# Patient Record
Sex: FEMALE | Race: BLACK OR AFRICAN AMERICAN
[De-identification: names, ages, dates, MRNs, and addresses within clinical notes are randomized per-mention and may not be internally consistent; named-entity substitution may affect disease eponyms.]

---

## 2019-02-13 ENCOUNTER — APPOINTMENT (OUTPATIENT)
Dept: SPINE | Facility: CLINIC | Age: 29
End: 2019-02-13
Payer: COMMERCIAL

## 2019-02-13 VITALS
RESPIRATION RATE: 14 BRPM | SYSTOLIC BLOOD PRESSURE: 116 MMHG | WEIGHT: 120 LBS | DIASTOLIC BLOOD PRESSURE: 79 MMHG | HEIGHT: 64 IN | BODY MASS INDEX: 20.49 KG/M2 | OXYGEN SATURATION: 99 % | HEART RATE: 64 BPM

## 2019-02-13 DIAGNOSIS — Z83.3 FAMILY HISTORY OF DIABETES MELLITUS: ICD-10-CM

## 2019-02-13 DIAGNOSIS — Z86.018 PERSONAL HISTORY OF OTHER BENIGN NEOPLASM: ICD-10-CM

## 2019-02-13 DIAGNOSIS — Z78.9 OTHER SPECIFIED HEALTH STATUS: ICD-10-CM

## 2019-02-13 DIAGNOSIS — D35.2 BENIGN NEOPLASM OF PITUITARY GLAND: ICD-10-CM

## 2019-02-13 PROCEDURE — 99244 OFF/OP CNSLTJ NEW/EST MOD 40: CPT

## 2019-02-13 RX ORDER — SUMATRIPTAN 100 MG/1
100 TABLET, FILM COATED ORAL
Refills: 0 | Status: ACTIVE | COMMUNITY

## 2019-02-28 ENCOUNTER — APPOINTMENT (OUTPATIENT)
Dept: ENDOCRINOLOGY | Facility: CLINIC | Age: 29
End: 2019-02-28
Payer: COMMERCIAL

## 2019-02-28 VITALS
SYSTOLIC BLOOD PRESSURE: 120 MMHG | BODY MASS INDEX: 20.49 KG/M2 | HEIGHT: 64 IN | HEART RATE: 80 BPM | OXYGEN SATURATION: 97 % | DIASTOLIC BLOOD PRESSURE: 78 MMHG | WEIGHT: 120 LBS

## 2019-02-28 PROCEDURE — 99244 OFF/OP CNSLTJ NEW/EST MOD 40: CPT

## 2019-02-28 RX ORDER — MEDROXYPROGESTERONE ACETATE 150 MG/ML
150 INJECTION, SUSPENSION INTRAMUSCULAR
Refills: 0 | Status: DISCONTINUED | COMMUNITY
End: 2019-02-28

## 2019-03-12 ENCOUNTER — RX RENEWAL (OUTPATIENT)
Age: 29
End: 2019-03-12

## 2019-03-12 LAB
MONOMERIC PROLACTIN (ICMA)*: 35 NG/ML
PERCENT MACROPROLACTIN: 0 %
PROLACTIN SERPL-MCNC: 45.1 NG/ML
PROLACTIN, SERUM (ICMA)*: 35 NG/ML

## 2019-03-15 ENCOUNTER — TRANSCRIPTION ENCOUNTER (OUTPATIENT)
Age: 29
End: 2019-03-15

## 2019-05-02 ENCOUNTER — APPOINTMENT (OUTPATIENT)
Dept: ENDOCRINOLOGY | Facility: CLINIC | Age: 29
End: 2019-05-02

## 2019-06-13 ENCOUNTER — TRANSCRIPTION ENCOUNTER (OUTPATIENT)
Age: 29
End: 2019-06-13

## 2019-06-19 ENCOUNTER — APPOINTMENT (OUTPATIENT)
Dept: SPINE | Facility: CLINIC | Age: 29
End: 2019-06-19

## 2019-06-27 ENCOUNTER — APPOINTMENT (OUTPATIENT)
Dept: ENDOCRINOLOGY | Facility: CLINIC | Age: 29
End: 2019-06-27

## 2019-11-15 ENCOUNTER — MEDICATION RENEWAL (OUTPATIENT)
Age: 29
End: 2019-11-15

## 2019-11-15 ENCOUNTER — TRANSCRIPTION ENCOUNTER (OUTPATIENT)
Age: 29
End: 2019-11-15

## 2020-11-18 ENCOUNTER — TRANSCRIPTION ENCOUNTER (OUTPATIENT)
Age: 30
End: 2020-11-18

## 2020-12-14 ENCOUNTER — TRANSCRIPTION ENCOUNTER (OUTPATIENT)
Age: 30
End: 2020-12-14

## 2020-12-16 ENCOUNTER — APPOINTMENT (OUTPATIENT)
Dept: SPINE | Facility: CLINIC | Age: 30
End: 2020-12-16

## 2021-05-14 ENCOUNTER — APPOINTMENT (OUTPATIENT)
Dept: SPINE | Facility: CLINIC | Age: 31
End: 2021-05-14
Payer: COMMERCIAL

## 2021-05-14 DIAGNOSIS — D35.2 BENIGN NEOPLASM OF PITUITARY GLAND: ICD-10-CM

## 2021-05-14 PROCEDURE — 99212 OFFICE O/P EST SF 10 MIN: CPT | Mod: 95

## 2021-05-14 NOTE — ASSESSMENT
[FreeTextEntry1] : The patient is to follow up with Dr. Bustillos and was advised to follow up with Dr. Fortune if she is still having headaches.  She is to have a new MRI of the brain and sella with and without contrast and return to the office for review of the images.  The patient was asked to also obtain her previous images.

## 2021-05-14 NOTE — CONSULT LETTER
[Dear  ___] : Dear  [unfilled], [Consult Letter:] : I had the pleasure of evaluating your patient, [unfilled]. [Please see my note below.] : Please see my note below. [Consult Closing:] : Thank you very much for allowing me to participate in the care of this patient.  If you have any questions, please do not hesitate to contact me. [Sincerely,] : Sincerely, [FreeTextEntry2] : Gabriele Fortune M.D.\par [address] [place] [address] [FreeTextEntry1] : Cathy Ortega\par 02//13/1990 [FreeTextEntry3] : PRESTON Kerr.\par Nurse Practitioner\par Neurosurgery and Spine Department\par [facility]\par

## 2021-05-14 NOTE — HISTORY OF PRESENT ILLNESS
[FreeTextEntry4] : PRESTON Kerr. [FreeTextEntry1] : MONICO CORDERO is a 31 year old lady who was last seen on 02/13/2019 regarding a prolactinoma.  In May of 2017 she complained of bilateral breast discomfort and had a work up which revealed an elevated prolactin level of 78.  She had an MRI at that time which showed an adenoma of the pituitary on the left side of the gland.  She saw an endocrinologist at that time and was started on Dostinex 0.5 mg 1 x week.  She than felt better and stopped taking it in October of 2017.  She was doing well until September of 2018, when she began having a feeling of headaches and forgetfulness.  She was followed by Dr. Fortune for a neurologic evaluation and had an MRI which again showed the abnormality on the left side of the gland.  She had a Prolactin done in December of 2018 that was 48.  The patient was followed by Dr. Maddie Frye and stated that she had been taking Cabergoline until April 20th.  She had a Prolactin level on 04/27/21 which was 56.5.  She was told that her prescription would not be refilled until she followed up in the office.  She has an upcoming appointment with Dr. Melissa Bustillos.  The patient stated that her periods are regular and she denies galactorrhea.  She also reported that She had a breast biopsy which was benign in July of 2019 and that in December of 2020 another right breast cyst was found.  She was also found to have lymphadenopathy in her axilla bilaterally and will be re imaged soon.

## 2021-05-14 NOTE — PHYSICAL EXAM
[FreeTextEntry1] : The exam is limited due to this being a telehealth appointment.  The patient is alert and oriented to person, place and time.  Higher cortical functions are intact. Her face is symmetrical.  Speech is clear and fluent. The patient appears to move all extremities well and to command.  There is no evidence of motor or sensory deficits. \par

## 2021-06-02 RX ORDER — CAMPHOR 0.45 %
25 GEL (GRAM) TOPICAL
Qty: 1 | Refills: 0 | Status: ACTIVE | COMMUNITY
Start: 2021-06-02 | End: 1900-01-01

## 2021-06-11 ENCOUNTER — APPOINTMENT (OUTPATIENT)
Dept: MRI IMAGING | Facility: CLINIC | Age: 31
End: 2021-06-11
Payer: COMMERCIAL

## 2021-06-11 ENCOUNTER — OUTPATIENT (OUTPATIENT)
Dept: OUTPATIENT SERVICES | Facility: HOSPITAL | Age: 31
LOS: 1 days | End: 2021-06-11
Payer: COMMERCIAL

## 2021-06-11 DIAGNOSIS — D35.2 BENIGN NEOPLASM OF PITUITARY GLAND: ICD-10-CM

## 2021-06-11 PROCEDURE — 70553 MRI BRAIN STEM W/O & W/DYE: CPT | Mod: 26

## 2021-06-11 PROCEDURE — A9585: CPT

## 2021-06-11 PROCEDURE — 70553 MRI BRAIN STEM W/O & W/DYE: CPT

## 2021-06-25 ENCOUNTER — APPOINTMENT (OUTPATIENT)
Dept: ENDOCRINOLOGY | Facility: CLINIC | Age: 31
End: 2021-06-25
Payer: COMMERCIAL

## 2021-06-25 VITALS
HEIGHT: 64 IN | HEART RATE: 80 BPM | SYSTOLIC BLOOD PRESSURE: 120 MMHG | BODY MASS INDEX: 20.49 KG/M2 | WEIGHT: 120 LBS | OXYGEN SATURATION: 99 % | DIASTOLIC BLOOD PRESSURE: 70 MMHG

## 2021-06-25 PROCEDURE — 99213 OFFICE O/P EST LOW 20 MIN: CPT

## 2021-06-25 PROCEDURE — 99072 ADDL SUPL MATRL&STAF TM PHE: CPT

## 2021-06-27 RX ORDER — PREDNISONE 50 MG/1
50 TABLET ORAL
Qty: 3 | Refills: 0 | Status: DISCONTINUED | COMMUNITY
Start: 2021-06-02 | End: 2021-06-27

## 2021-06-27 NOTE — ASSESSMENT
[FreeTextEntry1] : 32 yo F with PMH prolactinoma\par \par 1. Prolactinoma - refer for fasting prolactin. Continue cabergoline (reports HA - to divide biweekly)

## 2021-06-27 NOTE — HISTORY OF PRESENT ILLNESS
[FreeTextEntry1] : 30 yo F with PMH prolactinoma\par \par Dx with prolactinoma 2017 at which time she was placed on cabergoline 0.5 mg q week.\par Took this medication from 5/2017 - 1/2020\par MRI Brain 6/6/21 showed left 5 mm pituitary microadenoma\par h/o galactorrhea\par Previously followed by Endo Dr. Maddie Frye \par also admits to regular menstruation\par Nexplanon placed 2018

## 2021-07-07 ENCOUNTER — TRANSCRIPTION ENCOUNTER (OUTPATIENT)
Age: 31
End: 2021-07-07

## 2021-07-13 RX ORDER — CABERGOLINE 0.5 MG/1
0.5 TABLET ORAL
Qty: 8 | Refills: 0 | Status: ACTIVE | COMMUNITY
Start: 2019-03-12 | End: 1900-01-01